# Patient Record
Sex: MALE | Race: BLACK OR AFRICAN AMERICAN | NOT HISPANIC OR LATINO | Employment: FULL TIME | URBAN - METROPOLITAN AREA
[De-identification: names, ages, dates, MRNs, and addresses within clinical notes are randomized per-mention and may not be internally consistent; named-entity substitution may affect disease eponyms.]

---

## 2021-05-06 ENCOUNTER — PATIENT MESSAGE (OUTPATIENT)
Dept: RESEARCH | Facility: HOSPITAL | Age: 51
End: 2021-05-06

## 2023-04-14 ENCOUNTER — HOSPITAL ENCOUNTER (EMERGENCY)
Facility: HOSPITAL | Age: 53
Discharge: HOME | End: 2023-04-14
Attending: EMERGENCY MEDICINE

## 2023-04-14 VITALS
HEART RATE: 72 BPM | RESPIRATION RATE: 18 BRPM | WEIGHT: 201.5 LBS | BODY MASS INDEX: 30.54 KG/M2 | OXYGEN SATURATION: 99 % | TEMPERATURE: 98 F | SYSTOLIC BLOOD PRESSURE: 175 MMHG | HEIGHT: 68 IN | DIASTOLIC BLOOD PRESSURE: 94 MMHG

## 2023-04-14 DIAGNOSIS — R20.2 PARESTHESIA OF BOTH HANDS: ICD-10-CM

## 2023-04-14 DIAGNOSIS — M54.50 ACUTE MIDLINE LOW BACK PAIN WITHOUT SCIATICA: Primary | ICD-10-CM

## 2023-04-14 PROCEDURE — 96372 THER/PROPH/DIAG INJ SC/IM: CPT | Performed by: NURSE PRACTITIONER

## 2023-04-14 PROCEDURE — 99284 EMERGENCY DEPT VISIT MOD MDM: CPT

## 2023-04-14 PROCEDURE — 25000003 PHARM REV CODE 250: Performed by: NURSE PRACTITIONER

## 2023-04-14 PROCEDURE — 63600175 PHARM REV CODE 636 W HCPCS: Performed by: NURSE PRACTITIONER

## 2023-04-14 RX ORDER — CYCLOBENZAPRINE HCL 10 MG
10 TABLET ORAL 3 TIMES DAILY PRN
Qty: 15 TABLET | Refills: 0 | Status: SHIPPED | OUTPATIENT
Start: 2023-04-14 | End: 2023-04-19

## 2023-04-14 RX ORDER — CYCLOBENZAPRINE HCL 10 MG
10 TABLET ORAL
Status: COMPLETED | OUTPATIENT
Start: 2023-04-14 | End: 2023-04-14

## 2023-04-14 RX ORDER — DEXAMETHASONE SODIUM PHOSPHATE 4 MG/ML
8 INJECTION, SOLUTION INTRA-ARTICULAR; INTRALESIONAL; INTRAMUSCULAR; INTRAVENOUS; SOFT TISSUE
Status: COMPLETED | OUTPATIENT
Start: 2023-04-14 | End: 2023-04-14

## 2023-04-14 RX ADMIN — DEXAMETHASONE SODIUM PHOSPHATE 8 MG: 4 INJECTION, SOLUTION INTRA-ARTICULAR; INTRALESIONAL; INTRAMUSCULAR; INTRAVENOUS; SOFT TISSUE at 07:04

## 2023-04-14 RX ADMIN — CYCLOBENZAPRINE HYDROCHLORIDE 10 MG: 10 TABLET, FILM COATED ORAL at 07:04

## 2023-04-14 NOTE — ED TRIAGE NOTES
"Pt arrived to ED c/o lower back pain that started after pt attempted to lift his generator into the back of his truck (5 days ago). Pt is also complaining that his hands "lock up" for the past three months. Pt rates intermittent pain 7/10  "

## 2023-04-15 NOTE — ED PROVIDER NOTES
"Encounter Date: 4/14/2023       History     Chief Complaint   Patient presents with    Back Pain     Pt arrived to ED c/o lower back pain that started after pt attempted to lift his generator into the back of his truck. Pt is also complaining that his hands "lock up" for the past three months. Pt rates intermittent pain 7/10     Demetrio Sams is a 52 y.o. male with PMH of anemia, insomnia, chronic back pain who presents to the ED for evaluation of lower back pain.  Patient reports that he was picking up a heavy generator when he twisted his back earlier today.  He has been having an aching, constant, 8/10 pain to his lower back since this time.  Pain is exacerbated by movement, bending.  Denies alleviating factors.  Denies the pain radiates, denies numbness or tingling to bilateral lower extremities, saddle anesthesia, or bowel or bladder dysfunction.  He also notes bilateral hand paraesthesia when sleeping at night for the past several months.   No complaints now; only occurs at night.     The history is provided by the patient.   Review of patient's allergies indicates:   Allergen Reactions    Naproxen (bulk) Hives, Itching and Rash     Past Medical History:   Diagnosis Date    Anemia     Carpal tunnel syndrome     Insomnia     Lumbago      Past Surgical History:   Procedure Laterality Date    FRACTURE SURGERY       Family History   Problem Relation Age of Onset    No Known Problems Mother     No Known Problems Father     No Known Problems Sister     No Known Problems Brother      Social History     Tobacco Use    Smoking status: Never   Substance Use Topics    Alcohol use: No    Drug use: No     Review of Systems   Constitutional:  Negative for activity change, fatigue and fever.   HENT:  Negative for congestion, rhinorrhea and sore throat.    Respiratory:  Negative for cough, chest tightness and shortness of breath.    Cardiovascular:  Negative for chest pain.   Gastrointestinal:  Negative for abdominal pain, " diarrhea, nausea and vomiting.   Genitourinary:  Negative for dysuria.   Musculoskeletal:  Positive for back pain.   Neurological:  Negative for dizziness and weakness.     Physical Exam     Initial Vitals [04/14/23 1848]   BP Pulse Resp Temp SpO2   (!) 176/95 81 20 98 °F (36.7 °C) 100 %      MAP       --         Physical Exam    Nursing note and vitals reviewed.  Constitutional: He appears well-developed and well-nourished.   HENT:   Head: Normocephalic and atraumatic.   Eyes: Conjunctivae and EOM are normal. Pupils are equal, round, and reactive to light.   Neck: Neck supple.   Cardiovascular:  Normal rate, regular rhythm, normal heart sounds and intact distal pulses.           Pulmonary/Chest: Breath sounds normal.   Abdominal: Abdomen is soft. Bowel sounds are normal.   Musculoskeletal:      Cervical back: Normal and neck supple.      Thoracic back: Normal.      Lumbar back: Tenderness present. Decreased range of motion.     Neurological: He is alert and oriented to person, place, and time. He has normal strength.   Skin: Skin is warm and dry.   Psychiatric: He has a normal mood and affect. His behavior is normal. Judgment and thought content normal.       ED Course   Procedures  Labs Reviewed - No data to display       Imaging Results              X-Ray Lumbar Spine Ap And Lateral (Final result)  Result time 04/14/23 19:30:39      Final result by Kacie Jacobs MD (04/14/23 19:30:39)                   Impression:      No acute fracture      Electronically signed by: Kacie Jacobs  Date:    04/14/2023  Time:    19:30               Narrative:    EXAMINATION:  XR LUMBAR SPINE AP AND LATERAL    CLINICAL HISTORY:  Pain    TECHNIQUE:  AP, lateral and spot images were performed of the lumbar spine.    COMPARISON:  None    FINDINGS:  No acute fracture or listhesis.  Minimal L3-4 discogenic disease.                                       Medications   cyclobenzaprine tablet 10 mg (10 mg Oral Given 4/14/23  1928)   dexAMETHasone injection 8 mg (8 mg Intramuscular Given 4/14/23 1929)     Medical Decision Making:   Differential Diagnosis:   Lumbar strain, degenerative disc disease, compression fracture,    Clinical Tests:   Radiological Study: Ordered and Reviewed  ED Management:  Evaluation of a 52-year-old male with lower back pain after heavy lifting.  + LS spine tenderness with no step-off or deformity.  No sensory deficits.  Normal neurological exam.  X-ray shows mild discogenic disease.  He was given dexamethasone and Flexeril in the ED will be discharged home.  Outpatient referral to orthopedist placed for follow-up. Patient/caregiver voices understanding and feels comfortable with discharge plan.      The patient acknowledges that close follow up with medical provider is required. Instructed to follow up with PCP within 2 days. Patient was given specific return precautions. The patient agrees to comply with all instruction and directions given in the ER.                          Clinical Impression:   Final diagnoses:  [M54.50] Acute midline low back pain without sciatica (Primary)  [R20.2] Paresthesia of both hands        ED Disposition Condition    Discharge Stable          ED Prescriptions       Medication Sig Dispense Start Date End Date Auth. Provider    cyclobenzaprine (FLEXERIL) 10 MG tablet Take 1 tablet (10 mg total) by mouth 3 (three) times daily as needed for Muscle spasms. 15 tablet 4/14/2023 4/19/2023 Sammi Dobbs NP          Follow-up Information       Follow up With Specialties Details Why Contact Info    Mirtha Pardo MD Family Medicine Schedule an appointment as soon as possible for a visit in 2 days  [address] [place] [id number]  [phone]               Sammi Dobbs NP  04/14/23 2042

## 2024-10-17 ENCOUNTER — PATIENT MESSAGE (OUTPATIENT)
Dept: RESEARCH | Facility: HOSPITAL | Age: 54
End: 2024-10-17

## 2025-05-03 ENCOUNTER — HOSPITAL ENCOUNTER (EMERGENCY)
Facility: HOSPITAL | Age: 55
Discharge: HOME | End: 2025-05-03
Attending: STUDENT IN AN ORGANIZED HEALTH CARE EDUCATION/TRAINING PROGRAM

## 2025-05-03 VITALS
BODY MASS INDEX: 29.2 KG/M2 | DIASTOLIC BLOOD PRESSURE: 74 MMHG | SYSTOLIC BLOOD PRESSURE: 151 MMHG | HEIGHT: 68 IN | OXYGEN SATURATION: 99 % | WEIGHT: 192.69 LBS | TEMPERATURE: 98 F | HEART RATE: 67 BPM | RESPIRATION RATE: 16 BRPM

## 2025-05-03 DIAGNOSIS — M79.604 RIGHT LEG PAIN: Primary | ICD-10-CM

## 2025-05-03 PROCEDURE — 99282 EMERGENCY DEPT VISIT SF MDM: CPT

## 2025-05-03 RX ORDER — ACETAMINOPHEN 500 MG
500 TABLET ORAL EVERY 6 HOURS PRN
Qty: 30 TABLET | Refills: 0 | Status: SHIPPED | OUTPATIENT
Start: 2025-05-03

## 2025-05-03 NOTE — Clinical Note
"Demetrio"Eliezer Sams was seen and treated in our emergency department on 5/3/2025.  He may return to work on 05/07/2025.       If you have any questions or concerns, please don't hesitate to call.      Shaheed Wilkerson MD"

## 2025-05-03 NOTE — ED PROVIDER NOTES
Encounter Date: 5/3/2025       History     Chief Complaint   Patient presents with    Leg Pain     Pt arrived to ED c/o right upper thigh pain when he walks that started a week ago. Pt denies any falls or injuries. Pt rates pain 0/10 at rest and 10/10 when walking.      54-year-old male with history of carpal tunnel syndrome, lower back pain, presenting with right anterior thigh pain.  Patient reports that this has been present for the last few weeks, and is intermittent.  He states that when he sits down, in his lying flat he has no issues, but when he stands up the pain is sharp, stabbing, and intermittent.  Denies any trauma.  Reports that when his pain began he was also having right-sided lumbar back pain, but after being treated with an anti-inflammatory, this resolved.  Denies any new symptoms.  No swelling or skin changes.  No numbness or weakness.  No other complaints.  No saddle anesthesia, no urinary retention, no loss of bowel or bladder control.      Review of patient's allergies indicates:   Allergen Reactions    Naproxen (bulk) Hives, Itching and Rash     Past Medical History:   Diagnosis Date    Anemia     Carpal tunnel syndrome     Insomnia     Lumbago      Past Surgical History:   Procedure Laterality Date    FRACTURE SURGERY      LEG SURGERY       Family History   Problem Relation Name Age of Onset    No Known Problems Mother      No Known Problems Father      No Known Problems Sister      No Known Problems Brother       Social History[1]  Review of Systems   Constitutional:  Negative for fever.   HENT:  Negative for sore throat.    Respiratory:  Negative for shortness of breath.    Cardiovascular:  Negative for chest pain.   Gastrointestinal:  Negative for nausea.   Genitourinary:  Negative for dysuria.   Musculoskeletal:  Negative for back pain.        Right anterior thigh pain   Skin:  Negative for rash.   Neurological:  Negative for weakness and numbness.   Hematological:  Does not bruise/bleed  easily.       Physical Exam     Initial Vitals [05/03/25 0824]   BP Pulse Resp Temp SpO2   (!) 151/74 67 16 98.3 °F (36.8 °C) 99 %      MAP       --         Physical Exam    Nursing note and vitals reviewed.  Constitutional: He appears well-developed.   Eyes: EOM are normal.   Neck:   Normal range of motion.  Cardiovascular:            No murmur heard.  Pulmonary/Chest: No respiratory distress.   Abdominal: He exhibits no distension.   Musculoskeletal:         General: Normal range of motion.      Cervical back: Normal range of motion.      Comments: Visual inspection of R lower extremity displays no gross deformity, lacerations, effusions, ecchymosis or overlying skin lesions. FROM in all extremity joints in passive and active ROM displaying 5/5 MS. No palpable tiera TTP or masses. Compartments soft. Sensation, pulses and cap refill are normal for extremity.     No midline tenderness to palpation.  No lumbar tenderness across the back.     Neurological: He is alert.   Skin: Skin is warm.   Psychiatric: He has a normal mood and affect.         ED Course   Procedures  Labs Reviewed - No data to display       Imaging Results    None          Medications - No data to display  Medical Decision Making  DDX:  Patient's suffers from anterior thigh pain, shooting, intermittent, worse when standing up.  I suspect patient is suffering from intermittent sciatica.  No tenderness to palpation to midline suggest no spinal injury.  Patient's pain is in an L2/L3 distribution.  No symptoms of cauda equina.  DX:  None at this time  TX:  Offered analgesia, however patient declined.  Dispo:  Will discharge with Orthopedics follow up for suspected sciatica.        Risk  OTC drugs.                                      Clinical Impression:  Final diagnoses:  [M79.604] Right leg pain (Primary)          ED Disposition Condition    Discharge Stable          ED Prescriptions       Medication Sig Dispense Start Date End Date Auth. Provider     acetaminophen (TYLENOL) 500 MG tablet Take 1 tablet (500 mg total) by mouth every 6 (six) hours as needed for Pain. 30 tablet 5/3/2025 -- Shaheed Wilkerson MD          Follow-up Information       Follow up With Specialties Details Why Contact Info    [facility] Emergency Medicine  If symptoms worsen [facility] [facility] [address]  [phone]    [facility] Emergency Medicine  If symptoms worsen [facility] [facility] [address]  [phone]    Claudia Eduardo PA-C Orthopedic Surgery Schedule an appointment as soon as possible for a visit in 2 days  [address] [place] [address]  [phone]                 [1]   Social History  Tobacco Use    Smoking status: Never   Substance Use Topics    Alcohol use: No    Drug use: No        Shaheed Wilkerson MD  05/03/25 0846

## 2025-05-05 ENCOUNTER — TELEPHONE (OUTPATIENT)
Dept: ORTHOPEDICS | Facility: CLINIC | Age: 55
End: 2025-05-05

## 2025-05-05 NOTE — TELEPHONE ENCOUNTER
Returned pt call, he explained that he is having pain starting from his lower back going down into his le. I offered to get him scheduled with Dr. Stapleton with pain management at next available, 5/12 at 8:00. Pt accepted    ----- Message from [place] sent at 5/5/2025  8:09 AM CDT -----  Contact: PATIENT  Demetrio SamsMRN: [id number]: 1970PCP: No, Primary DoctorHome Phone      Not on file.Work Phone      Not on file.Mobile          [phone]: Patient was seen in the emergency over the weekend for right leg pain.Phone: [phone]